# Patient Record
Sex: MALE | Race: ASIAN | NOT HISPANIC OR LATINO | URBAN - METROPOLITAN AREA
[De-identification: names, ages, dates, MRNs, and addresses within clinical notes are randomized per-mention and may not be internally consistent; named-entity substitution may affect disease eponyms.]

---

## 2022-08-28 ENCOUNTER — EMERGENCY (EMERGENCY)
Facility: HOSPITAL | Age: 57
LOS: 0 days | Discharge: HOME | End: 2022-08-28
Attending: STUDENT IN AN ORGANIZED HEALTH CARE EDUCATION/TRAINING PROGRAM
Payer: COMMERCIAL

## 2022-08-28 VITALS
HEART RATE: 74 BPM | TEMPERATURE: 98 F | WEIGHT: 134.92 LBS | RESPIRATION RATE: 18 BRPM | SYSTOLIC BLOOD PRESSURE: 124 MMHG | OXYGEN SATURATION: 97 % | DIASTOLIC BLOOD PRESSURE: 91 MMHG

## 2022-08-28 DIAGNOSIS — R11.10 VOMITING, UNSPECIFIED: ICD-10-CM

## 2022-08-28 DIAGNOSIS — R19.7 DIARRHEA, UNSPECIFIED: ICD-10-CM

## 2022-08-28 DIAGNOSIS — R10.9 UNSPECIFIED ABDOMINAL PAIN: ICD-10-CM

## 2022-08-28 DIAGNOSIS — K64.4 RESIDUAL HEMORRHOIDAL SKIN TAGS: ICD-10-CM

## 2022-08-28 PROCEDURE — 99282 EMERGENCY DEPT VISIT SF MDM: CPT

## 2022-08-28 PROCEDURE — 99283 EMERGENCY DEPT VISIT LOW MDM: CPT

## 2022-08-28 NOTE — ED STATDOCS - NS_ ATTENDINGSCRIBEDETAILS _ED_A_ED_FT
I, Timur Bee DO,  performed the initial face to face bedside interview with this patient regarding history of present illness, review of symptoms and relevant past medical, social and family history.  I completed an independent physical examination.  I was the initial provider who evaluated this patient.   I personally saw the patient and performed a substantive portion of the visit including all aspects of the medical decision making.  I have signed out the follow up of any pending tests (i.e. labs, radiological studies) to the THERESA.  I have communicated the patient’s plan of care and disposition with the THERESA.  The history, relevant review of systems, past medical and surgical history, medical decision making, and physical examination was documented by the scribe in my presence and I attest to the accuracy of the documentation.

## 2022-08-28 NOTE — ED STATDOCS - PROGRESS NOTE DETAILS
56 y/o male w/ no pertinent PMHx presents to the ED c/o vomiting and abd pain and one episode of diarrhea since last night. Pt was eating vegetables. tofu, kimchi, rice when the pain started. Pt describes the pain as extreme and pressure. Pain has since subsided. Pt reports bowel movement this morning and noticed a light pink streak on toilet paper. Pt is due to have a colonoscopy this year. Pt reports that no one else became sick after eating the food. Pt. is a 57 year old man, no medical history, presents with abdominal pain and blood in stool this morning.  Pt. was eating tobu and kinchi last night and soon after started having abdominal cramping.  Pain throughout the night.  Pt. had one episode of diarrhea.    Pt. awoke today and pain improved and had a small amount of "pink on tissue" when wiping.  Neg. Fever, chills, vomiting.  Stool was soft this morning.  His wife ate same meal without consequences.  PT. feeling well in ED.  Small external hemorrhoid.  Will DC with GI follow up.  Return for any worsening symptoms.  Minoo Hedrick PA-C

## 2022-08-28 NOTE — ED STATDOCS - CARE PROVIDER_API CALL
Bradley Michael  GASTROENTEROLOGY  [address]  [place] [address]  Phone: [phone]  Fax: [phone]  Follow Up Time:

## 2022-08-28 NOTE — ED STATDOCS - ATTENDING APP SHARED VISIT CONTRIBUTION OF CARE
Attending Cristal: I performed a history and physical exam of the patient and discussed their management with the THERESA. I have reviewed the THERESA note and agree with the documented findings and plan of care, except as noted. This was a shared visit with an THERESA. I reviewed and verified the documentation and independently performed my own history/exam/medical decision making. My medical decision making and observations are found above. Please refer to any progress notes for updates on clinical course.

## 2022-08-28 NOTE — ED STATDOCS - PHYSICAL EXAMINATION
Gen: NAD, AOx3, able to make needs known, non-toxic  Head: NCAT  HEENT: EOMI, oral mucosa moist, normal conjunctiva  Lung: CTAB, no respiratory distress, no wheezes/rhonchi/rales B/L, speaking in full sentences  CV: RRR, no murmurs  Abd: non distended, soft, nontender, no guarding, no CVA tenderness  MSK: no visible deformities  Neuro: Appears non focal  Skin: Warm, well perfused, no rash  Psych: normal affect  GI: Stool guaiac performed. Lot #: [id number]; Expiration: 1/31/2023; Result: scattered, nonthrombosed, external hemorrhoids. no bleeding. Chaperoned by Minoo MONTEIRO). Gen: NAD, AOx3, able to make needs known, non-toxic  Head: NCAT  HEENT: EOMI, oral mucosa moist, normal conjunctiva  Lung: CTAB, no respiratory distress, no wheezes/rhonchi/rales B/L, speaking in full sentences  CV: RRR, no murmurs  Abd: non distended, soft, nontender, no guarding, no CVA tenderness  MSK: no visible deformities  Neuro: Appears non focal  Skin: Warm, well perfused, no rash  Psych: normal affect  Rectal: multiple, nonthrombosed, external hemorrhoids. no bleeding. Chaperoned by Minoo Hedrick (PA).

## 2022-08-28 NOTE — ED STATDOCS - CLINICAL SUMMARY MEDICAL DECISION MAKING FREE TEXT BOX
58 y/o male w/ no pertinent PMHx presents to the ED c/o 58 y/o male w/ no pertinent PMHx presents to the ED c/o abd pain and bloody stool. Pt overall well appearing, NAD. Abd pain resolved at this time. BM this morning w/ only streak of blood on toilet paper, stool brown otherwise. Pt not on AC. Vitals reassuring. Do not suspect serious lower GI bleeding. Possible mild colitis? Possibly hemorrhoidal bleeding. Shared decision making conversation w/ pt. Pt comfortable w/ discharge home at this time and monitoring of symptoms at home without further worked up in ED. Has PCP to follow up with. Return precautions discussed at length, pt verbalized understanding. Ready for DC.

## 2022-08-28 NOTE — ED STATDOCS - NS ED ATTENDING STATEMENT MOD
This was a shared visit with the THERESA. I reviewed and verified the documentation and independently performed the documented:

## 2022-08-28 NOTE — ED STATDOCS - OBJECTIVE STATEMENT
56 y/o male w/ no pertinent PMHx presents to the ED c/o vomiting and abd pain and one episode of diarrhea since last night. Pt was eating vegetables. tofu, kimchi, rice when the pain started. Pt describes the pain as extreme and pressure. Pain has since subsided. Pt reports bowel movement this morning and noticed a light pink streak on toilet paper. Pt is due to have a colonoscopy this year. Pt reports that no one else became sick after eating the food. 56 y/o male w/ no pertinent PMHx presents to the ED c/o vomiting and abd pain and one episode of diarrhea since last night. Pt was eating vegetables. tofu, kimchi, rice before the pain started. Pt describes the pain as extreme and pressure. Pain has since subsided. Had blood in toilet mixed in with stool last night. Pt reports bowel movement this morning and noticed a light pink streak on toilet paper. Pt is due to have a colonoscopy this year. Pt reports that no one else became sick after eating the food. No feveres.

## 2022-08-28 NOTE — ED STATDOCS - PATIENT PORTAL LINK FT
You can access the FollowMyHealth Patient Portal offered by [facility] by registering at the following website: [email or web address]. By joining [employer]’s FollowMyHealth portal, you will also be able to view your health information using other applications (apps) compatible with our system.

## 2023-03-31 ENCOUNTER — EMERGENCY (EMERGENCY)
Facility: HOSPITAL | Age: 58
LOS: 0 days | Discharge: HOME | End: 2023-03-31
Attending: EMERGENCY MEDICINE
Payer: COMMERCIAL

## 2023-03-31 VITALS
DIASTOLIC BLOOD PRESSURE: 89 MMHG | SYSTOLIC BLOOD PRESSURE: 127 MMHG | TEMPERATURE: 98 F | HEART RATE: 82 BPM | RESPIRATION RATE: 18 BRPM | OXYGEN SATURATION: 99 %

## 2023-03-31 VITALS — HEIGHT: 63 IN | WEIGHT: 132.06 LBS

## 2023-03-31 DIAGNOSIS — K59.00 CONSTIPATION, UNSPECIFIED: ICD-10-CM

## 2023-03-31 PROCEDURE — 99284 EMERGENCY DEPT VISIT MOD MDM: CPT

## 2023-03-31 PROCEDURE — 99282 EMERGENCY DEPT VISIT SF MDM: CPT

## 2023-03-31 NOTE — ED STATDOCS - PATIENT PORTAL LINK FT
You can access the FollowMyHealth Patient Portal offered by [facility] by registering at the following website: [email or web address]. By joining [employer]’s FollowMyHealth portal, you will also be able to view your health information using other applications (apps) compatible with our system.

## 2023-03-31 NOTE — ED STATDOCS - NSFOLLOWUPINSTRUCTIONS_ED_ALL_ED_FT
TAKE MIRALAX, over the counter, DAILY, until soft bowel movement  Also get Fleet enema from pharmacy  Then take metamucil, over the counter, daily  Follow up with your primary doctor  Follow up with GI      Constipation, Adult  Constipation is when a person has fewer than three bowel movements in a week, has difficulty having a bowel movement, or has stools (feces) that are dry, hard, or larger than normal. Constipation may be caused by an underlying condition. It may become worse with age if a person takes certain medicines and does not take in enough fluids.    Follow these instructions at home:  Eating and drinking      Eat foods that have a lot of fiber, such as beans, whole grains, and fresh fruits and vegetables.  Limit foods that are low in fiber and high in fat and processed sugars, such as fried or sweet foods. These include french fries, hamburgers, cookies, candies, and soda.  Drink enough fluid to keep your urine pale yellow.  General instructions    Exercise regularly or as told by your health care provider. Try to do 150 minutes of moderate exercise each week.  Use the bathroom when you have the urge to go. Do not hold it in.  Take over-the-counter and prescription medicines only as told by your health care provider. This includes any fiber supplements.  During bowel movements:  Practice deep breathing while relaxing the lower abdomen.  Practice pelvic floor relaxation.  Watch your condition for any changes. Let your health care provider know about them.  Keep all follow-up visits as told by your health care provider. This is important.  Contact a health care provider if:  You have pain that gets worse.  You have a fever.  You do not have a bowel movement after 4 days.  You vomit.  You are not hungry or you lose weight.  You are bleeding from the opening between the buttocks (anus).  You have thin, pencil-like stools.  Get help right away if:  You have a fever and your symptoms suddenly get worse.  You leak stool or have blood in your stool.  Your abdomen is bloated.  You have severe pain in your abdomen.  You feel dizzy or you faint.  Summary  Constipation is when a person has fewer than three bowel movements in a week, has difficulty having a bowel movement, or has stools (feces) that are dry, hard, or larger than normal.  Eat foods that have a lot of fiber, such as beans, whole grains, and fresh fruits and vegetables.  Drink enough fluid to keep your urine pale yellow.  Take over-the-counter and prescription medicines only as told by your health care provider. This includes any fiber supplements.  This information is not intended to replace advice given to you by your health care provider. Make sure you discuss any questions you have with your health care provider.

## 2023-03-31 NOTE — ED STATDOCS - OBJECTIVE STATEMENT
59 yo 57 yo male with no pmhx c/o hard stool x several days/weeks. today, patient reports passing small hard stool, and feels constipated  denies nausea or vomiting  denies fever or chills  didn't take any meds pta

## 2023-03-31 NOTE — ED ADULT TRIAGE NOTE - CHIEF COMPLAINT QUOTE
Pt arrives to ED complaining of abdominal pain/constipation starting this morning. denies medical history.

## 2023-03-31 NOTE — ED STATDOCS - PHYSICAL EXAMINATION
aaox3  neck supple  cta b/l, [id number]  abd soft, non tender  no rebound or guarding  +BS  no cva tenderness  normal gait

## 2023-03-31 NOTE — ED STATDOCS - CARE PROVIDER_API CALL
Bradley Michael  GASTROENTEROLOGY  [address]  [place] [address]  Phone: [phone]  Fax: [phone]  Follow Up Time:

## 2023-03-31 NOTE — ED STATDOCS - CLINICAL SUMMARY MEDICAL DECISION MAKING FREE TEXT BOX
patient reports hard stool for a while and passing small hard stool today  abd soft, non tender  tolerating po  will recommend otc meds and can follow up with pmd/gi  no signs of acute/surgical abdomen

## 2023-04-01 PROBLEM — Z78.9 OTHER SPECIFIED HEALTH STATUS: Chronic | Status: ACTIVE | Noted: 2022-08-30

## 2023-08-28 ENCOUNTER — EMERGENCY (EMERGENCY)
Facility: HOSPITAL | Age: 58
LOS: 0 days | Discharge: HOME | End: 2023-08-28
Attending: EMERGENCY MEDICINE
Payer: COMMERCIAL

## 2023-08-28 VITALS — HEIGHT: 66 IN | WEIGHT: 132.06 LBS

## 2023-08-28 VITALS
DIASTOLIC BLOOD PRESSURE: 74 MMHG | TEMPERATURE: 100 F | SYSTOLIC BLOOD PRESSURE: 108 MMHG | OXYGEN SATURATION: 99 % | HEART RATE: 93 BPM

## 2023-08-28 DIAGNOSIS — R50.9 FEVER, UNSPECIFIED: ICD-10-CM

## 2023-08-28 DIAGNOSIS — M79.10 MYALGIA, UNSPECIFIED SITE: ICD-10-CM

## 2023-08-28 DIAGNOSIS — R51.9 HEADACHE, UNSPECIFIED: ICD-10-CM

## 2023-08-28 DIAGNOSIS — B34.9 VIRAL INFECTION, UNSPECIFIED: ICD-10-CM

## 2023-08-28 DIAGNOSIS — Z20.822 CONTACT WITH AND (SUSPECTED) EXPOSURE TO COVID-19: ICD-10-CM

## 2023-08-28 LAB
RAPID RVP RESULT: SIGNIFICANT CHANGE UP
SARS-COV-2 RNA SPEC QL NAA+PROBE: SIGNIFICANT CHANGE UP

## 2023-08-28 PROCEDURE — 0225U NFCT DS DNA&RNA 21 SARSCOV2: CPT

## 2023-08-28 PROCEDURE — 96374 THER/PROPH/DIAG INJ IV PUSH: CPT

## 2023-08-28 PROCEDURE — 99284 EMERGENCY DEPT VISIT MOD MDM: CPT | Mod: 25

## 2023-08-28 PROCEDURE — 99284 EMERGENCY DEPT VISIT MOD MDM: CPT

## 2023-08-28 RX ORDER — SODIUM CHLORIDE 9 MG/ML
1000 INJECTION INTRAMUSCULAR; INTRAVENOUS; SUBCUTANEOUS ONCE
Refills: 0 | Status: COMPLETED | OUTPATIENT
Start: 2023-08-28 | End: 2023-08-28

## 2023-08-28 RX ORDER — ACETAMINOPHEN 500 MG
650 TABLET ORAL ONCE
Refills: 0 | Status: COMPLETED | OUTPATIENT
Start: 2023-08-28 | End: 2023-08-28

## 2023-08-28 RX ORDER — KETOROLAC TROMETHAMINE 30 MG/ML
15 SYRINGE (ML) INJECTION ONCE
Refills: 0 | Status: DISCONTINUED | OUTPATIENT
Start: 2023-08-28 | End: 2023-08-28

## 2023-08-28 RX ORDER — PSEUDOEPHEDRINE HCL 30 MG
30 TABLET ORAL ONCE
Refills: 0 | Status: COMPLETED | OUTPATIENT
Start: 2023-08-28 | End: 2023-08-28

## 2023-08-28 RX ADMIN — SODIUM CHLORIDE 2000 MILLILITER(S): 9 INJECTION INTRAMUSCULAR; INTRAVENOUS; SUBCUTANEOUS at 13:43

## 2023-08-28 RX ADMIN — Medication 650 MILLIGRAM(S): at 14:55

## 2023-08-28 RX ADMIN — Medication 30 MILLIGRAM(S): at 13:43

## 2023-08-28 RX ADMIN — Medication 15 MILLIGRAM(S): at 13:43

## 2023-08-28 NOTE — ED STATDOCS - ATTENDING APP SHARED VISIT CONTRIBUTION OF CARE
I personally saw the patient with the THERESA, and completed the key components of the history and physical exam. I then discussed the management plan with the THERESA.

## 2023-08-28 NOTE — ED STATDOCS - CLINICAL SUMMARY MEDICAL DECISION MAKING FREE TEXT BOX
57 y/o male with no pertinent PMHx presents to the ED with congestion, body aches, low-grade fever. Concerned because he is going on a trip to [place]. Will do viral swab and treat patient symptomatically for fever at home.

## 2023-08-28 NOTE — ED STATDOCS - OBJECTIVE STATEMENT
59 y/o male with no pertinent PMHx presents to the ED c/o sinus headache, body aches, and low-grade fever x3 days, for which he took Tylenol PTA. Patient scheduled to travel to [place] tomorrow. Denies cough, vomiting, diarrhea. Denies sick contacts. Nonsmoker, NKDA.

## 2023-08-28 NOTE — ED ADULT TRIAGE NOTE - CHIEF COMPLAINT QUOTE
Pt presents to er with complaints of congestion, mild fever and generally not feeling well, denies sick contacts (unknown fever).

## 2023-08-28 NOTE — ED STATDOCS - NSFOLLOWUPINSTRUCTIONS_ED_ALL_ED_FT
you may take OTC decongestants such as sudafed or mucinex as needed        Viral Respiratory Infection  A viral respiratory infection is an illness that affects parts of the body used for breathing, like the lungs, nose, and throat. It is caused by a germ called a virus.    ImageSome examples of this kind of infection are:    A cold.  The flu (influenza).  A respiratory syncytial virus (RSV) infection.    How do I know if I have this infection?  Most of the time this infection causes:    A stuffy or runny nose.  Yellow or green fluid in the nose.  A cough.  Sneezing.  Tiredness (fatigue).  Achy muscles.  A sore throat.  Sweating or chills.  A fever.  A headache.    How is this infection treated?  If the flu is diagnosed early, it may be treated with an antiviral medicine. This medicine shortens the length of time a person has symptoms. Symptoms may be treated with over-the-counter and prescription medicines, such as:    Expectorants. These make it easier to cough up mucus.  Decongestant nasal sprays.    Doctors do not prescribe antibiotic medicines for viral infections. They do not work with this kind of infection.    How do I know if I should stay home?  To keep others from getting sick, stay home if you have:    A fever.  A lasting cough.  A sore throat.  A runny nose.  Sneezing.  Muscles aches.  Headaches.  Tiredness.  Weakness.  Chills.  Sweating.  An upset stomach (nausea).    Follow these instructions at home:  Rest as much as possible.  Take over-the-counter and prescription medicines only as told by your doctor.  Drink enough fluid to keep your pee (urine) clear or pale yellow.  Gargle with salt water. Do this 3–4 times per day or as needed. To make a salt–water mixture, dissolve ½–1 tsp of salt in 1 cup of warm water. Make sure the salt dissolves all the way.  Use nose drops made from salt water. This helps with stuffiness (congestion). It also helps soften the skin around your nose.  Do not drink alcohol.  Do not use tobacco products, including cigarettes, chewing tobacco, and e-cigarettes. If you need help quitting, ask your doctor.  Get help if:  Your symptoms last for 10 days or longer.  Your symptoms get worse over time.  You have a fever.  You have very bad pain in your face or forehead.  Parts of your jaw or neck become very swollen.  Get help right away if:  You feel pain or pressure in your chest.  You have shortness of breath.  You faint or feel like you will faint.  You keep throwing up (vomiting).  You feel confused.  This information is not intended to replace advice given to you by your health care provider. Make sure you discuss any questions you have with your health care provider.

## 2023-08-28 NOTE — ED STATDOCS - PROGRESS NOTE DETAILS
Patient seen and evaluated, ED attending note and orders reviewed, will continue with patient follow up and care -Brooke Lopez PA-C RVP pending, pt feeling better after IVF and toradol,  advised pt we will call with + rvp results, advised otc decongestants, supportive care, return precautions given  Brooke Lopez PA-C

## 2024-05-28 NOTE — ED STATDOCS - CHIEF COMPLAINT
The patient is a 57y year old Male complaining of bloody stools. [Initial Evaluation] : an initial evaluation [Patient] : patient [Mother] : mother [FreeTextEntry1] : In toeing  [TWNoteComboBox1] : Baham[place]

## 2024-11-12 NOTE — ED ADULT TRIAGE NOTE - SOURCE OF INFORMATION
UROLOGY CONSULTATION - male     I have been asked to see this patient  in consultation at the kind request of Ortiz Love MD for urinary retention.  A copy of this note has been sent to Ortiz Love MD.     I have reviewed the nurse/MA notes and assessment and agree.      UROLOGY CHIEF COMPLAINT   Chief Complaint   Patient presents with    Office Visit    New Patient    UTI       UROLOGY HISTORY OF PRESENT ILLNESS    Mr. Bandar Solomon is a 76 year old year old male who presents with urinary retention.    This gentleman is seen in consultation for urinary retention, urinary tract infection and recent sepsis.  He went into retention, was seen in the emergency department and noted to have a fever of 102.  He had a catheter placed for high residual urine.  He developed sepsis.  He was inpatient for for 5 days for treatment.  He was discharged home after successful voiding trial on tamsulosin.    Prior to his admission to the hospital, he is having irritative symptoms including frequency and urgency.  He was inpatient as [place].     .  4 adult children.  Retired auto worker.  Non smoker.     PAST MEDICAL HISTORY      Essential (primary) hypertension                2000          High cholesterol                                              Hydrocele, bilateral                            08/2020         Comment: Bilateral asymptomatic, clinically only the Rt                (MD2)    Patient Active Problem List   Diagnosis    Essential hypertension    Mixed hyperlipidemia    Type 2 diabetes mellitus with stage 3 chronic kidney disease, without long-term current use of insulin  (CMD)    CKD (chronic kidney disease) stage 3, GFR 30-59 ml/min  (CMD)    Former cigarette smoker    Class 1 obesity due to excess calories with serious comorbidity and body mass index (BMI) of 32.0 to 32.9 in adult    Urinary retention      PAST SURGICAL HISTORY      TOE SURGERY                                     1997             Comment: great toe    TUMOR REMOVAL                                               Comment: fatty tumor removed right shoulder    SOCIAL HISTORY  Social History     Tobacco Use    Smoking status: Former     Current packs/day: 0.00     Types: Cigarettes     Start date: 1996     Quit date: 1998     Years since quittin.8    Smokeless tobacco: Never   Substance Use Topics    Alcohol use: Yes     Comment: social, beer     I reviewed the social history.  All identified relevant social history is included in the HPI and/or assessment/plan.    FAMILY HISTORY  Family History   Problem Relation Age of Onset    Diabetes Mother     Emphysema Father     Congestive Heart Failure Sister     Substance Abuse Brother     Genitourinary Neg Hx      I reviewed the family history.  All identified relevant family history is included in the HPI and/or assessment/plan.  No identified history of urinary symptoms or congenital abnormalities have been identified.  There is no reported history of prostate cancer.    MEDICATIONS    Current Outpatient Medications   Medication Sig    tamsulosin (FLOMAX) 0.4 MG Cap Take 0.4 mg by mouth daily.    atorvastatin (LIPITOR) 40 MG tablet TAKE 1 TABLET BY MOUTH DAILY    lisinopril (ZESTRIL) 10 MG tablet Take 1 tablet by mouth daily.     No current facility-administered medications for this visit.     ALLERGIES    ALLERGIES:  No Known Allergies    Review of Systems    Obtained by patient intake form and entered by the medical assistant. (see MA notes). I have reviewed the pertinent positives and negatives with the patient and agree with documentation entered.     PHYSICAL EXAM    Vital Signs: Blood pressure 120/72, pulse 80, resp. rate 16.   General: The patient is well-developed, well-nourished, in no acute distress, appears stated age.   Neurologic: Alert. Normal mood and affect.   Skin: Warm and dry.   Neck: Symmetric without swelling or tenderness.   Respiratory: Respiratory effort  normal.   Cardiovascular: Regular rate and rhythm.  Lymphatics: There is no neck or groin adenopathy.   Back: No costovertebral angle tenderness.   Abdomen: Bladder and kidneys are nonpalpable. There are no inguinal or ventral hernias present. There is no hepatosplenomegaly. There are no other abdominal masses present. Stool specimen not indicated.  Extremities: No swelling or tenderness.   Genitourinary:  There is no evidence of a fissure. Scrotum without lesions. Epididymides descended bilaterally without mass or tenderness. Testicles descended bilaterally symmetric without masses or tenderness. Urethral meatus normal size and position. Phallus without skin lesions or Peyronie's plaque.  Right hydrocele  Digital Rectal Exam:  Anus and perineum normal.Prostate smooth and symmetric. Seminal vesicles nonpalpable. Sphincter tone normal. 1 + smooth and tender.  Not enlarged.      DATA REVIEWED  Lab Results   Component Value Date    CREATININE 1.28 (H) 06/19/2024    CREATININE 1.39 (H) 06/25/2019     Lab Results   Component Value Date    PSA 0.95 08/08/2024    PSA 1.04 08/07/2023    PSA 0.84 07/01/2022    PSA 0.86 06/30/2021    PSA 0.78 06/05/2018         CT ABDOMEN PELVIS W CONTRAST  Order: [id number]  Impression    IMPRESSION:  1. Marked hyperemia of the urinary bladder urothelium with bladder wall  trabeculation and pericystic fat stranding consistent with cystitis and  chronic bladder outlet obstruction. Marked prostate enlargement is  noted.  2. Nonobstructing RIGHT renal calculi.  3. RIGHT scrotal hydrocele is partially included.  4. Sigmoid diverticulosis.    Lab Results   Component Value Date    5COL Yellow 11/12/2024    5UAPP Clear 11/12/2024    5UGLU Negative 11/12/2024    5UBILI Negative 11/12/2024    5UKET Negative 11/12/2024    5USPG 1.010 11/12/2024    5URBC Negative 11/12/2024    5UPH 6.0 11/12/2024    5UPROT Trace (A) 11/12/2024    5UURP 0.2 11/12/2024    POCTUNITR Negative 11/12/2024    5UWBC Negative  11/12/2024       AssessmenT/PLAN  This gentleman had recent septic event, urinary tract infection and retention.  He was initially managed with an indwelling Lassiter catheter which has subsequently been removed.  He was given a short prescription for daily tamsulosin.    Today he feels better.  His urinalysis is near normal.  His examination shows a relatively small prostate, minimally tender still.    I recommend daily tamsulosin.  CT urogram followed by cystoscopy in the office.    DISCUSSION  Flexible cystourethroscopy has been discussed. The reason for the procedural recommendation has been reviewed. Risks of this procedure include urinary tract bleeding and a theoretical risk that the procedure could lead to injury of the urethra and/or bladder.  Cystoscopy can also lead to a urinary tract infection. A single dose of antibiotics will be given at the time of cystoscopy.  A topical anesthetic lubricant is usually used immediately prior to this procedure.  This can help a patient tolerate the procedure but is unlikely to make the procedure completely comfortable.         Thank you for allowing us to be involved in the care of your patient.  Please call with any questions.    Hosea Hallman MD   [facility]  Office [phone]     Note to Patient/ Disclaimer: The 21st Century Cures Act makes medical notes like these available to patients in the interest of transparency. However, be advised this is a medical document. It is intended as [id number] communication. It is written in medical language and may contain abbreviations or verbiage that are unfamiliar. It may appear blunt or direct. Medical documents are intended to carry relevant information, facts as evident, and the clinical opinion of the practitioner.  This document is not intended to be a comprehensive summary of the medical record or a medical circumstance.  Plans may change based on information that is not conveyed in this note. This  note may have been transcribed using a voice dictation system. Voice recognition errors may occur. This should not be taken to alter the content or meaning of this note.      Patient

## 2025-08-29 ENCOUNTER — EMERGENCY (EMERGENCY)
Facility: HOSPITAL | Age: 60
LOS: 0 days | Discharge: HOME | End: 2025-08-29
Attending: STUDENT IN AN ORGANIZED HEALTH CARE EDUCATION/TRAINING PROGRAM
Payer: COMMERCIAL

## 2025-08-29 VITALS
OXYGEN SATURATION: 100 % | RESPIRATION RATE: 18 BRPM | HEART RATE: 73 BPM | DIASTOLIC BLOOD PRESSURE: 87 MMHG | WEIGHT: 148.59 LBS | SYSTOLIC BLOOD PRESSURE: 124 MMHG | TEMPERATURE: 98 F

## 2025-08-29 DIAGNOSIS — D75.839 THROMBOCYTOSIS, UNSPECIFIED: ICD-10-CM

## 2025-08-29 DIAGNOSIS — N50.1 VASCULAR DISORDERS OF MALE GENITAL ORGANS: ICD-10-CM

## 2025-08-29 DIAGNOSIS — R30.0 DYSURIA: ICD-10-CM

## 2025-08-29 LAB
ALBUMIN SERPL ELPH-MCNC: 4 G/DL — SIGNIFICANT CHANGE UP (ref 3.3–5)
ALP SERPL-CCNC: 66 U/L — SIGNIFICANT CHANGE UP (ref 40–120)
ALT FLD-CCNC: 33 U/L — SIGNIFICANT CHANGE UP (ref 12–78)
ANION GAP SERPL CALC-SCNC: 4 MMOL/L — LOW (ref 5–17)
APPEARANCE UR: CLEAR — SIGNIFICANT CHANGE UP
AST SERPL-CCNC: 24 U/L — SIGNIFICANT CHANGE UP (ref 15–37)
BASOPHILS # BLD AUTO: 0.03 K/UL — SIGNIFICANT CHANGE UP (ref 0–0.2)
BASOPHILS NFR BLD AUTO: 0.4 % — SIGNIFICANT CHANGE UP (ref 0–2)
BILIRUB SERPL-MCNC: 0.4 MG/DL — SIGNIFICANT CHANGE UP (ref 0.2–1.2)
BILIRUB UR-MCNC: NEGATIVE — SIGNIFICANT CHANGE UP
BUN SERPL-MCNC: 13 MG/DL — SIGNIFICANT CHANGE UP (ref 7–23)
CALCIUM SERPL-MCNC: 9.2 MG/DL — SIGNIFICANT CHANGE UP (ref 8.5–10.1)
CHLORIDE SERPL-SCNC: 102 MMOL/L — SIGNIFICANT CHANGE UP (ref 96–108)
CO2 SERPL-SCNC: 29 MMOL/L — SIGNIFICANT CHANGE UP (ref 22–31)
COLOR SPEC: YELLOW — SIGNIFICANT CHANGE UP
CREAT SERPL-MCNC: 1 MG/DL — SIGNIFICANT CHANGE UP (ref 0.5–1.3)
DIFF PNL FLD: NEGATIVE — SIGNIFICANT CHANGE UP
EGFR: 86 ML/MIN/1.73M2 — SIGNIFICANT CHANGE UP
EGFR: 86 ML/MIN/1.73M2 — SIGNIFICANT CHANGE UP
EOSINOPHIL # BLD AUTO: 0.05 K/UL — SIGNIFICANT CHANGE UP (ref 0–0.5)
EOSINOPHIL NFR BLD AUTO: 0.6 % — SIGNIFICANT CHANGE UP (ref 0–6)
GLUCOSE SERPL-MCNC: 103 MG/DL — HIGH (ref 70–99)
GLUCOSE UR QL: NEGATIVE MG/DL — SIGNIFICANT CHANGE UP
HCT VFR BLD CALC: 43.2 % — SIGNIFICANT CHANGE UP (ref 39–50)
HGB BLD-MCNC: 14.3 G/DL — SIGNIFICANT CHANGE UP (ref 13–17)
IMM GRANULOCYTES # BLD AUTO: 0.01 K/UL — SIGNIFICANT CHANGE UP (ref 0–0.07)
IMM GRANULOCYTES NFR BLD AUTO: 0.1 % — SIGNIFICANT CHANGE UP (ref 0–0.9)
KETONES UR QL: NEGATIVE MG/DL — SIGNIFICANT CHANGE UP
LACTATE SERPL-SCNC: 1.4 MMOL/L — SIGNIFICANT CHANGE UP (ref 0.7–2)
LEUKOCYTE ESTERASE UR-ACNC: NEGATIVE — SIGNIFICANT CHANGE UP
LYMPHOCYTES # BLD AUTO: 1.92 K/UL — SIGNIFICANT CHANGE UP (ref 1–3.3)
LYMPHOCYTES NFR BLD AUTO: 23.4 % — SIGNIFICANT CHANGE UP (ref 13–44)
MCHC RBC-ENTMCNC: 33.1 G/DL — SIGNIFICANT CHANGE UP (ref 32–36)
MCHC RBC-ENTMCNC: 33.5 PG — SIGNIFICANT CHANGE UP (ref 27–34)
MCV RBC AUTO: 101.2 FL — HIGH (ref 80–100)
MONOCYTES # BLD AUTO: 0.28 K/UL — SIGNIFICANT CHANGE UP (ref 0–0.9)
MONOCYTES NFR BLD AUTO: 3.4 % — SIGNIFICANT CHANGE UP (ref 2–14)
NEUTROPHILS # BLD AUTO: 5.93 K/UL — SIGNIFICANT CHANGE UP (ref 1.8–7.4)
NEUTROPHILS NFR BLD AUTO: 72.1 % — SIGNIFICANT CHANGE UP (ref 43–77)
NITRITE UR-MCNC: NEGATIVE — SIGNIFICANT CHANGE UP
NRBC # BLD AUTO: 0 K/UL — SIGNIFICANT CHANGE UP (ref 0–0)
NRBC # FLD: 0 K/UL — SIGNIFICANT CHANGE UP (ref 0–0)
NRBC BLD AUTO-RTO: 0 /100 WBCS — SIGNIFICANT CHANGE UP (ref 0–0)
PH UR: 7.5 — SIGNIFICANT CHANGE UP (ref 5–8)
PLATELET # BLD AUTO: 685 K/UL — HIGH (ref 150–400)
PMV BLD: 9 FL — SIGNIFICANT CHANGE UP (ref 7–13)
POTASSIUM SERPL-MCNC: 4.1 MMOL/L — SIGNIFICANT CHANGE UP (ref 3.5–5.3)
POTASSIUM SERPL-SCNC: 4.1 MMOL/L — SIGNIFICANT CHANGE UP (ref 3.5–5.3)
PROT SERPL-MCNC: 8.5 GM/DL — HIGH (ref 6–8.3)
PROT UR-MCNC: NEGATIVE MG/DL — SIGNIFICANT CHANGE UP
RBC # BLD: 4.27 M/UL — SIGNIFICANT CHANGE UP (ref 4.2–5.8)
RBC # FLD: 12.5 % — SIGNIFICANT CHANGE UP (ref 10.3–14.5)
SODIUM SERPL-SCNC: 135 MMOL/L — SIGNIFICANT CHANGE UP (ref 135–145)
SP GR SPEC: 1.01 — SIGNIFICANT CHANGE UP (ref 1–1.03)
UROBILINOGEN FLD QL: 0.2 MG/DL — SIGNIFICANT CHANGE UP (ref 0.2–1)
WBC # BLD: 8.22 K/UL — SIGNIFICANT CHANGE UP (ref 3.8–10.5)
WBC # FLD AUTO: 8.22 K/UL — SIGNIFICANT CHANGE UP (ref 3.8–10.5)

## 2025-08-29 PROCEDURE — 36415 COLL VENOUS BLD VENIPUNCTURE: CPT

## 2025-08-29 PROCEDURE — 99285 EMERGENCY DEPT VISIT HI MDM: CPT

## 2025-08-29 PROCEDURE — 76870 US EXAM SCROTUM: CPT | Mod: 26

## 2025-08-29 PROCEDURE — 76870 US EXAM SCROTUM: CPT

## 2025-08-29 PROCEDURE — 99285 EMERGENCY DEPT VISIT HI MDM: CPT | Mod: 25

## 2025-08-29 PROCEDURE — 93975 VASCULAR STUDY: CPT

## 2025-08-29 PROCEDURE — 83605 ASSAY OF LACTIC ACID: CPT

## 2025-08-29 PROCEDURE — 85025 COMPLETE CBC W/AUTO DIFF WBC: CPT

## 2025-08-29 PROCEDURE — 93975 VASCULAR STUDY: CPT | Mod: 26

## 2025-08-29 PROCEDURE — 80053 COMPREHEN METABOLIC PANEL: CPT

## 2025-08-29 PROCEDURE — 81003 URINALYSIS AUTO W/O SCOPE: CPT

## 2025-08-29 PROCEDURE — 74177 CT ABD & PELVIS W/CONTRAST: CPT | Mod: 26

## 2025-08-29 PROCEDURE — 74177 CT ABD & PELVIS W/CONTRAST: CPT

## 2025-08-29 RX ORDER — SULFAMETHOXAZOLE/TRIMETHOPRIM 800-160 MG
1 TABLET ORAL
Qty: 14 | Refills: 0
Start: 2025-08-29 | End: 2025-09-04

## 2025-08-29 RX ORDER — SULFAMETHOXAZOLE/TRIMETHOPRIM 800-160 MG
1 TABLET ORAL ONCE
Refills: 0 | Status: COMPLETED | OUTPATIENT
Start: 2025-08-29 | End: 2025-08-29

## 2025-08-29 RX ADMIN — Medication 1 TABLET(S): at 20:19
